# Patient Record
Sex: FEMALE | Race: WHITE | URBAN - METROPOLITAN AREA
[De-identification: names, ages, dates, MRNs, and addresses within clinical notes are randomized per-mention and may not be internally consistent; named-entity substitution may affect disease eponyms.]

---

## 2024-07-01 ENCOUNTER — OFFICE VISIT (OUTPATIENT)
Dept: DERMATOLOGY | Facility: CLINIC | Age: 59
End: 2024-07-01
Payer: COMMERCIAL

## 2024-07-01 DIAGNOSIS — L57.0 ACTINIC KERATOSIS: ICD-10-CM

## 2024-07-01 DIAGNOSIS — L57.3 POIKILODERMA OF CIVATTE: ICD-10-CM

## 2024-07-01 DIAGNOSIS — L24.9 IRRITANT CONTACT DERMATITIS, UNSPECIFIED TRIGGER: Primary | ICD-10-CM

## 2024-07-01 PROCEDURE — 99203 OFFICE O/P NEW LOW 30 MIN: CPT

## 2024-07-01 PROCEDURE — 17000 DESTRUCT PREMALG LESION: CPT

## 2024-07-01 RX ORDER — BETAMETHASONE VALERATE 1 MG/G
CREAM TOPICAL 2 TIMES DAILY
Qty: 15 G | Refills: 0 | Status: SHIPPED | OUTPATIENT
Start: 2024-07-01

## 2024-07-01 NOTE — PROGRESS NOTES
Subjective   HPI: Nicole Noble is a 59 y.o. female new patient who presents in office for evaluation and treatment of multiple concerns.     Phx of skin cancer - YES wildcard/NO: No  Fhx of skin cancer - Yes/No/Unsure: No  SPF use growing up - YES wildcard/NO: Yes   SPF use now - YES wildcard/NO: Yes   Any tanning bed use - Yes/No/Unsure: No    Dry patch on top of left ear  [facility] - needs tx    Chest  Darker  Becomes pruritic    Finger  Pruritic papule      ROS: No other skin or systemic complaints other than what is documented elsewhere in the note.    ALLERGIES: Patient has no known allergies.    SOCIAL:  has no history on file for tobacco use, alcohol use, and drug use.    Objective   Right Dorsal Mid 2nd Finger  Erythematous papule    Left Scaphoid Fossa  -Thin erythematous papules with gritty scale      Left Breast, Right Breast  Spotty depigmentation and hyperpigmentation, and spotty hyperkeratosis with solar lentigo's in light exposed areas          Assessment/Plan   1. Irritant contact dermatitis, unspecified trigger  Right Dorsal Mid 2nd Finger    Trial of bmv applied bid x14 days.    Related Medications  betamethasone valerate (Valisone) 0.1 % cream  Apply topically 2 times a day. Apply topically to affected area bid for 14 days. DO NOT USE ON FACE OR GROIN.    2. Actinic keratosis  Left Scaphoid Fossa    Discussed precancerous nature of AK's with patient. I recommended cryotherapy for treatment today. I discussed possible side effects of cryotherapy with patient, including erythema, swelling, or blistering. Patient verbalizes understanding and opts for treatment today.    Discussed with patient that if the areas that were treated with LN2 today do not resolve within 4 weeks to please call my office for evaluation.    A total of 1 AK's were tx with cryotherapy today.    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         FOLLOW UP: please schedule fbse    The patient was encouraged to contact me with any further questions or concerns.  Licha Andrews PA-C  7/4/2024

## 2024-08-08 ENCOUNTER — APPOINTMENT (OUTPATIENT)
Dept: DERMATOLOGY | Facility: CLINIC | Age: 59
End: 2024-08-08
Payer: COMMERCIAL

## 2024-08-08 DIAGNOSIS — L85.3 XEROSIS CUTIS: ICD-10-CM

## 2024-08-08 DIAGNOSIS — L57.8 DIFFUSE PHOTOAGING OF SKIN: ICD-10-CM

## 2024-08-08 DIAGNOSIS — D23.9 DERMATOFIBROMA: ICD-10-CM

## 2024-08-08 DIAGNOSIS — B07.8 FLAT WART: ICD-10-CM

## 2024-08-08 DIAGNOSIS — D22.9 MULTIPLE BENIGN NEVI: ICD-10-CM

## 2024-08-08 DIAGNOSIS — L21.9 SEBORRHEIC DERMATITIS: Primary | ICD-10-CM

## 2024-08-08 PROCEDURE — 99214 OFFICE O/P EST MOD 30 MIN: CPT

## 2024-08-08 RX ORDER — CICLOPIROX 1 G/100ML
SHAMPOO TOPICAL
Qty: 120 ML | Refills: 11 | Status: SHIPPED | OUTPATIENT
Start: 2024-08-08

## 2024-08-08 RX ORDER — ROFLUMILAST 3 MG/G
1 AEROSOL, FOAM TOPICAL DAILY
Qty: 60 G | Refills: 2 | Status: SHIPPED | OUTPATIENT
Start: 2024-08-08

## 2024-08-08 NOTE — PROGRESS NOTES
Subjective   HPI: Nicole Noble is a 59 y.o. female who presents in office for a full body skin examination.     Phx of skin cancer - YES wildcard/NO: No  Fhx of skin cancer - Yes/No/Unsure: No  SPF use growing up - YES wildcard/NO: Yes   SPF use now - YES wildcard/NO: Yes   Any tanning bed use - Yes/No/Unsure: No    ROS: No other skin or systemic complaints other than what is documented elsewhere in the note.    ALLERGIES: Patient has no known allergies.    SOCIAL:  has no history on file for tobacco use, alcohol use, and drug use.      Objective   A chaperone was present during the entirety of the examination.    Well appearing patient in no apparent distress; mood and affect are within normal limits.    A full examination was performed including scalp, head, eyes, ears, nose, lips, mouth, tongue, neck, chest, axillae, abdomen, back, buttocks, bilateral upper extremities, bilateral lower extremities, hands, feet, fingers, toes, fingernails, and toenails. Cervical, supraclavicular, axillary and inguinal lymph nodes were palpated. All findings within normal limits unless otherwise noted below.    Numerous benign appearing symmetrical, regular boarders, evenly pigmented, nevi    Spotty depigmentation and hyperpigmentation, and spotty hyperkeratosis with solar lentigo's in light exposed areas     Scalp  Symmetric erythematous patches overlying greasy scales.    Left Lower Leg - Anterior  Firm pink-brown papule that dimples with lateral pressure.    Left Hand - Anterior  1 mm flesh colored flat topped papule(s), no surrounding erythema, no crusting, and no discharge        Assessment/Plan   1. Seborrheic dermatitis  Scalp    Discussed nature of seborrheic dermatitis with patient.  I recommended starting treatment with ciclopirox shampoo and Zoryve foam.  Discussed the importance of washing hair every day.  Patient verbalizes understanding and opts for treatment today.      Related Medications  ciclopirox 1 %  shampoo  Wet hair and apply shampoo to scalp. Lather and leave on for 3 minutes. Rinse. Do this twice a week at night.    roflumilast (Zoryve) 0.3 % foam  Apply 1 Application topically once daily. Apply a thin layer of ZORYVE foam 1 time a day to the affected areas on skin and scalp when they are not wet.    2. Dermatofibroma  Left Lower Leg - Anterior    Dermatofibroma's commonly occur in traumatized areas from nicking the area shaving, running into furniture, or an insect bite.  These are benign.  When they begin causing pain that is when I recommend removal.      3. Flat wart  Left Hand - Anterior    Discussed flat warts with patient.  Sometimes these can spontaneously resolve.  I discussed treatment options including imiquimod, cryotherapy, and observation.  Patient opts for observation at this time.    4. Xerosis cutis    Dry skin is common, can be worsened by climate (dry climate makes worse), harsh soaps, and lack of moisturizing. It may worsen as we age. I recommend a gentle skin care regimen including washing with a mild soap without fragrance such as dove for sensitive skin, cetaphil or cerave. Pat dry after washing and then apply a thick moisturizer such as Cerave cream.    5. Multiple benign nevi    Discussed the need for annual body examination. The patient was advised to practice sun protection and sun avoidance, use daily sunscreen, and perform regular self skin exams.  Sun protection was discussed, including avoiding the mid-day sun, wearing a sunscreen with SPF at least 60, and stressing the need for reapplication of sunscreen and applying more than they think they need.     Discussed the ABCDEs of melanoma and recommended patient observe moles for any changes.  Patient verbalizes understanding.    6. Diffuse photoaging of skin    Discussed the need for annual body examination. The patient was advised to practice sun protection and sun avoidance, use daily sunscreen, and perform regular self skin  exams.  Sun protection was discussed, including avoiding the mid-day sun, wearing a sunscreen with SPF at least 60 that is broad spectrum and water resistant, and stressing the need for reapplication of sunscreen and applying more than they think they need.          FOLLOW UP: 1 year full-body skin examination-sooner if needed    The patient was encouraged to contact me with any further questions or concerns.  Licha Andrews PA-C  8/8/2024